# Patient Record
Sex: FEMALE | Race: WHITE
[De-identification: names, ages, dates, MRNs, and addresses within clinical notes are randomized per-mention and may not be internally consistent; named-entity substitution may affect disease eponyms.]

---
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
## 2018-01-01 ENCOUNTER — HOSPITAL ENCOUNTER (EMERGENCY)
Dept: HOSPITAL 46 - ED | Age: 18
LOS: 1 days | Discharge: HOME | End: 2018-01-02
Payer: COMMERCIAL

## 2018-01-01 VITALS — WEIGHT: 275 LBS | BODY MASS INDEX: 41.68 KG/M2 | HEIGHT: 68 IN

## 2018-01-01 DIAGNOSIS — E66.9: ICD-10-CM

## 2018-01-01 DIAGNOSIS — Z79.899: ICD-10-CM

## 2018-01-01 DIAGNOSIS — R10.10: Primary | ICD-10-CM

## 2018-01-01 DIAGNOSIS — J45.909: ICD-10-CM

## 2018-05-03 ENCOUNTER — HOSPITAL ENCOUNTER (EMERGENCY)
Dept: HOSPITAL 46 - ED | Age: 18
LOS: 1 days | Discharge: HOME | End: 2018-05-04
Payer: COMMERCIAL

## 2018-05-03 VITALS — BODY MASS INDEX: 41.62 KG/M2 | WEIGHT: 259 LBS | HEIGHT: 66 IN

## 2018-05-03 DIAGNOSIS — K59.00: Primary | ICD-10-CM

## 2018-07-25 ENCOUNTER — HOSPITAL ENCOUNTER (EMERGENCY)
Dept: HOSPITAL 46 - ED | Age: 18
Discharge: HOME | End: 2018-07-25
Payer: COMMERCIAL

## 2018-07-25 VITALS — BODY MASS INDEX: 43.07 KG/M2 | HEIGHT: 66 IN | WEIGHT: 267.99 LBS

## 2018-07-25 DIAGNOSIS — H57.8: Primary | ICD-10-CM

## 2019-01-01 ENCOUNTER — HOSPITAL ENCOUNTER (EMERGENCY)
Dept: HOSPITAL 46 - ED | Age: 19
LOS: 1 days | Discharge: HOME | End: 2019-01-02
Payer: COMMERCIAL

## 2019-01-01 VITALS — HEIGHT: 66 IN | BODY MASS INDEX: 43.07 KG/M2 | WEIGHT: 267.99 LBS

## 2019-01-01 DIAGNOSIS — K52.9: Primary | ICD-10-CM

## 2019-01-01 DIAGNOSIS — J45.909: ICD-10-CM

## 2019-03-03 ENCOUNTER — HOSPITAL ENCOUNTER (EMERGENCY)
Dept: HOSPITAL 46 - ED | Age: 19
Discharge: HOME | End: 2019-03-03
Payer: COMMERCIAL

## 2019-03-03 VITALS — HEIGHT: 66 IN | BODY MASS INDEX: 38.57 KG/M2 | WEIGHT: 240 LBS

## 2019-03-03 DIAGNOSIS — Z23: ICD-10-CM
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
## 2019-03-03 DIAGNOSIS — S91.312A: Primary | ICD-10-CM

## 2019-03-03 DIAGNOSIS — J45.909: ICD-10-CM

## 2019-03-03 DIAGNOSIS — Z79.899: ICD-10-CM

## 2019-09-25 ENCOUNTER — HOSPITAL ENCOUNTER (OUTPATIENT)
Dept: HOSPITAL 46 - DS | Age: 19
Discharge: HOME | End: 2019-09-25
Attending: OBSTETRICS & GYNECOLOGY
Payer: COMMERCIAL

## 2019-09-25 VITALS — HEIGHT: 66 IN | BODY MASS INDEX: 33.11 KG/M2 | WEIGHT: 206 LBS

## 2019-09-25 DIAGNOSIS — N92.1: ICD-10-CM

## 2019-09-25 DIAGNOSIS — Z62.810: ICD-10-CM

## 2019-09-25 DIAGNOSIS — F43.12: ICD-10-CM

## 2019-09-25 DIAGNOSIS — R10.2: ICD-10-CM

## 2019-09-25 DIAGNOSIS — K21.9: ICD-10-CM

## 2019-09-25 DIAGNOSIS — N94.4: Primary | ICD-10-CM

## 2019-09-25 DIAGNOSIS — Z79.899: ICD-10-CM

## 2019-09-25 PROCEDURE — 0WJJ4ZZ INSPECTION OF PELVIC CAVITY, PERCUTANEOUS ENDOSCOPIC APPROACH: ICD-10-PCS | Performed by: OBSTETRICS & GYNECOLOGY

## 2019-09-25 NOTE — NUR
PATIENT STANDS AND AMBULATES TO THE BATHROOM. PATIENT DENIES DIZZINESS.
PATIENT VOIDS 650 ML PINK URINE AND AMBULATES BACK TO HER ROOM. DISCHARGE
INSTRUCTIONS ARE GIVEN IN PRESENCE OF FRIEND AND MOTHER AND ALL VERBALIZES
UNDERSTANDING. PATIENT IS GETTING DRESSED IN PRESENCE OF FRIEND AND MOTHER.
PATIENT REPORTS A DECREASE IN PAIN AFTER VOIDING AND RATES HER PAIN 5/10 AT
THIS TIME.

## 2019-09-25 NOTE — OR
[facility]
                                    [address], [place]  [address]
_________________________________________________________________________________________
                                                                 Draft    
 
 
DATE OF OPERATION:
2019
 
SURGEON:
Madison Soto MD
 
PREOPERATIVE DIAGNOSES:
1. Pelvic pain.
2. Dysmenorrhea.
 
POSTOPERATIVE DIAGNOSES:
1. Pelvic pain.
2. Dysmenorrhea.
3. Normal pelvis.
 
PROCEDURE PERFORMED:
Diagnostic laparoscopy.
 
ANESTHESIA:
General ET.
 
ESTIMATED BLOOD LOSS:
Minimal.
 
DRAINS:
None.
 
INDICATIONS AND FINDINGS:
The patient is a 18-year-old female,  0, who has a long history of dysmenorrhea
and pelvic pain.  She did try birth control pills, but did not tolerate these because of
migraine with aura.  She was then placed on depo.  She has continued to have pelvic pain
and dysmenorrhea.  She is having abnormal bleeding, which in and of itself is not too
abnormal with that aura.  At the time of surgery, exam under anesthesia was normal.  At
the time of laparoscopy, the pelvis was completely normal. 
 
DESCRIPTION OF PROCEDURE:
The patient was prepped and draped in the dorsal lithotomy position.  A weighted
speculum was placed.  The anterior lip of the cervix was visualized and a single-tooth
tenaculum was placed on the anterior lip and this was followed by a Hulka clamp with
removal of the tenaculum and weighted speculum.  Attention was directed above.  The
infraumbilical area was injected with Marcaine, incised with a knife, and each layer was
then serially elevated and incised until the fascia was opened and identified, and stay
 
                                                                                    
_________________________________________________________________________________________
PATIENT NAME:     NAZ PÉREZ                        
MEDICAL RECORD #: [id number]            OPERATIVE REPORT              
          ACCT #: [id number]  
DATE OF BIRTH:   [birth year]            REPORT #: [id number]      
PHYSICIAN:        MADISON SOTO MD            
PCP:              MADISON SOTO MD            
REPORT IS CONFIDENTIAL AND NOT TO BE RELEASED WITHOUT AUTHORIZATION
 
 
                                  [facility]
                                    [address] [place] [address]  [address]
_________________________________________________________________________________________
                                                                 Draft    
 
 
sutures of 0 Vicryl were placed.  The peritoneum was opened bluntly.  The Saúl cannula
was then placed and balloon inflated.  CO2 was then introduced into the abdomen.
Placing the scope confirmed proper positioning.  A secondary port was placed on the left
side slightly below the umbilicus and lateral.  This area was transilluminated, injected
with the Marcaine, incised with a knife, and a trocar placed under direct vision.
Following this, the pelvis was completely visualized and no abnormalities were found.
There was no evidence of endometriosis or scar tissue.  The appendix was also visualized
and seen to be normal as well.  The procedure was then terminated with removal of the
instruments after allowing as much CO2 as possible to escape.  Following this, the
fascial incision was reidentified and the fascia was closed with running suture of 0
Vicryl.  The stay sutures were tied across as well.  The subcu and the deep space were
closed with a figure-of-eight suture of 0 Vicryl as well.  The skin incisions were
closed with subcuticular sutures of 3-0 Vicryl Rapide.  Attention was directed down
below and the instrument removed from the uterus.  There was no evidence of ongoing
bleeding from the cervix.  The procedure was then terminated.  The patient was taken to
the recovery room in good condition. 
 
 
 
            ________________________________________
            MD JERI Ivey/MODL
Job #:  [id number]
DD:  2019 13:40:39
DT:  2019 17:39:38
 
 
Copies:                                
~
 
 
 
 
 
 
 
 
 
 
 
 
                                                                                    
_________________________________________________________________________________________
PATIENT NAME:     NAZ PÉREZ                        
MEDICAL RECORD #: [id number]            OPERATIVE REPORT              
          ACCT #: [id number]  
DATE OF BIRTH:   [birth year]            REPORT #: [id number]      
PHYSICIAN:        MADISON SOTO MD            
PCP:              MADISON SOTO MD            
REPORT IS CONFIDENTIAL AND NOT TO BE RELEASED WITHOUT AUTHORIZATION

## 2019-12-10 ENCOUNTER — HOSPITAL ENCOUNTER (EMERGENCY)
Dept: HOSPITAL 46 - ED | Age: 19
Discharge: HOME | End: 2019-12-10
Payer: COMMERCIAL

## 2019-12-10 VITALS — HEIGHT: 66 IN | WEIGHT: 206 LBS | BODY MASS INDEX: 33.11 KG/M2

## 2019-12-10 DIAGNOSIS — J11.1: Primary | ICD-10-CM

## 2019-12-10 DIAGNOSIS — Z79.899: ICD-10-CM

## 2019-12-10 DIAGNOSIS — J45.909: ICD-10-CM

## 2020-11-22 ENCOUNTER — HOSPITAL ENCOUNTER (EMERGENCY)
Dept: HOSPITAL 46 - ED | Age: 20
LOS: 1 days | Discharge: HOME | End: 2020-11-23
Payer: COMMERCIAL

## 2020-11-22 VITALS — BODY MASS INDEX: 35.68 KG/M2 | WEIGHT: 222 LBS | HEIGHT: 66 IN

## 2020-11-22 DIAGNOSIS — Z79.899: ICD-10-CM

## 2020-11-22 DIAGNOSIS — K29.20: Primary | ICD-10-CM

## 2020-11-22 DIAGNOSIS — J45.909: ICD-10-CM

## 2020-11-22 NOTE — XMS
PreManage Notification: NAZ PÉREZ MRN:[id number]
 
Security Information
 
Security Events
No recent Security Events currently on file
 
 
 
CRITERIA MET
------------
- [facility] Has Care Guidelines
 
 
CARE PROVIDERS
There are no care providers on record at this time.
 
Guidelines Source: [employer]  [place]
Guidelines Date: 12/20/2019
 
Care Coordination:
    Has\T\nbsp;received\T\nbsp;mental health services with [employer].\T\nbsp; Please 
    contact [employer] with mental health\T\nbsp;concerns.\T\nbsp; [place]
    [facility]: [phone]\T\nbsp; [place]: [phone].
 
 
E.D. VISIT COUNT (12 MO.)
-------------------------------------------------------------------------------------
1 [address]
-------------------------------------------------------------------------------------
TOTAL 3
-------------------------------------------------------------------------------------
NOTE: Visits indicate total known visits.
 
ED/UCC VISIT TRACKING (12 MO.)
-------------------------------------------------------------------------------------
11/22/2020 20:28
[facility] [place]
 
TYPE: Emergency
 
COMPLAINT:
- VOMITING
-------------------------------------------------------------------------------------
08/30/2020 00:47
[facility] [place]
.
 
TYPE: Emergency
 
COMPLAINT:
- Left thumb lac
 
DIAGNOSES:
- Left thumb lac
- Laceration without foreign body of left thumb without damage to nail, initial
encounter
 
- Laceration
-------------------------------------------------------------------------------------
12/10/2019 01:32
[facility] [place]
 
TYPE: Emergency
 
COMPLAINT:
- HIGH FEVER
 
DIAGNOSES:
- Other long term (current) drug therapy
- Cough
- Influenza due to unidentified influenza virus with other respiratory manifestations
- Unspecified asthma, uncomplicated
-------------------------------------------------------------------------------------
 
 
INPATIENT VISIT TRACKING (12 MO.)
No inpatient visits to display in this time frame
 
[email or web address]

## 2021-05-22 ENCOUNTER — HOSPITAL ENCOUNTER (EMERGENCY)
Dept: HOSPITAL 46 - ED | Age: 21
Discharge: HOME | End: 2021-05-22
Payer: COMMERCIAL

## 2021-05-22 VITALS — BODY MASS INDEX: 35.03 KG/M2 | WEIGHT: 217.99 LBS | HEIGHT: 66 IN

## 2021-05-22 DIAGNOSIS — Z79.899: ICD-10-CM

## 2021-05-22 DIAGNOSIS — Z88.8: ICD-10-CM

## 2021-05-22 DIAGNOSIS — J45.909: ICD-10-CM

## 2021-05-22 DIAGNOSIS — S43.402A: Primary | ICD-10-CM
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
## 2021-05-22 NOTE — XMS
PreManage Notification: NAZ PÉREZ MRN:[id number]
 
Security Information
 
Security Events
No recent Security Events currently on file
 
 
 
CRITERIA MET
------------
- [facility] - Has Care Guidelines
 
 
CARE PROVIDERS
-------------------------------------------------------------------------------------
DNAIEL HINTON     Internal Medicine     11/23/2020-Current
 
PHONE: [phone]
-------------------------------------------------------------------------------------
PATRICIA HAMLIN      Obstetrics \T\ Gynecology     11/23/2020-Current
 
PHONE: [phone]
-------------------------------------------------------------------------------------
 
Guidelines Source: "[employer]
Guidelines Date: 12/20/2019
 
Care Coordination:
    Has\T\nbsp;received\T\nbsp;mental health services with [employer].\T\nbsp; Please 
    contact [employer] with mental health\T\nbsp;concerns.\T\nbsp; [place]: [phone]\T\nbsp; [place]: [phone].
 
Care History
Medical/Surgical
11/16/2020    [facility]
 
      - Patient is currently established with [facility]. If patient is seen 
      in the ED during business hours. Please contact [facility] at [facility].
      Care Recommendation: If this patient has had 5 or more Emergency Department
      visits in the last 12 months.\T\nbsp; Patient will require education on the
      scope and purpose of the ED as an acute care provider not a Primary Care
      Provider and should not be utilized for chronic conditions.\T\nbsp; These are
      guidelines and the provider should exercise clinical judgment when providing
      care.
[facility].D. V[facility] (12 MO.)
[facility]
TOTAL 3
-------------------------------------------------------------------------------------
NOTE: Visits indicate total known visits.
 
ED/UCC VISIT TRACKING (12 MO.)
-------------------------------------------------------------------------------------
05/22/2021 14:25
[facility] [place]
 
 
TYPE: Emergency
 
COMPLAINT:
- LT SHOULDER OTJ INJURY
-------------------------------------------------------------------------------------
11/22/2020 20:28
[facility] [place]
 
TYPE: Emergency
 
COMPLAINT:
- VOMITING
 
DIAGNOSES:
- Unspecified asthma, uncomplicated
- Nausea with vomiting, unspecified
- Alcoholic gastritis without bleeding
- Other long term (current) drug therapy
-------------------------------------------------------------------------------------
08/30/2020 00:47
[facility] [place]

 
TYPE: Emergency
 
COMPLAINT:
- Left thumb lac
 
DIAGNOSES:
- Left thumb lac
- Laceration without foreign body of left thumb without damage to nail, initial
encounter
- Laceration
-------------------------------------------------------------------------------------
 
 
INPATIENT VISIT TRACKING (12 MO.)
No inpatient visits to display in this time frame
 
[email or web address]

## 2022-06-28 ENCOUNTER — HOSPITAL ENCOUNTER (EMERGENCY)
Dept: HOSPITAL 46 - ED | Age: 22
Discharge: HOME | End: 2022-06-28
Payer: COMMERCIAL

## 2022-06-28 VITALS — HEIGHT: 66 IN | WEIGHT: 189.99 LBS | BODY MASS INDEX: 30.53 KG/M2

## 2022-06-28 DIAGNOSIS — Z20.822: ICD-10-CM

## 2022-06-28 DIAGNOSIS — K52.9: Primary | ICD-10-CM

## 2022-06-28 DIAGNOSIS — J45.909: ICD-10-CM

## 2022-06-28 DIAGNOSIS — Z88.8: ICD-10-CM

## 2022-06-28 PROCEDURE — U0003 INFECTIOUS AGENT DETECTION BY NUCLEIC ACID (DNA OR RNA); SEVERE ACUTE RESPIRATORY SYNDROME CORONAVIRUS 2 (SARS-COV-2) (CORONAVIRUS DISEASE [COVID-19]), AMPLIFIED PROBE TECHNIQUE, MAKING USE OF HIGH THROUGHPUT TECHNOLOGIES AS DESCRIBED BY CMS-2020-01-R: HCPCS

## 2022-06-28 NOTE — EKG
[facility]
                                    [address]
                                  [place], [place]  [address]
_________________________________________________________________________________________
                                                                 Signed   
 
 
Marked sinus bradycardia with premature atrial complexes
Nonspecific T wave abnormality
Abnormal ECG
No previous ECGs available
Confirmed by RICKY ARGUELLES MD ([id number]) on 6/28/2022 8:26:06 PM
 
 
 
 
 
 
 
 
 
 
 
 
 
 
 
 
 
 
 
 
 
 
 
 
 
 
 
 
 
 
 
 
 
 
 
 
 
 
 
 
    Electronically Signed By: RICKY ARGUELLES MD  06/28/22 2026
_________________________________________________________________________________________
PATIENT NAME:     NAZ PÉREZ                        
MEDICAL RECORD #: [id number]                     Electrocardiogram             
          ACCT #: [id number]  
DATE OF BIRTH:   [birth year]                                       
PHYSICIAN:   RICKY ARGUELLES MD                   REPORT #: [id number]
REPORT IS CONFIDENTIAL AND NOT TO BE RELEASED WITHOUT AUTHORIZATION

## 2023-05-21 ENCOUNTER — HOSPITAL ENCOUNTER (EMERGENCY)
Dept: HOSPITAL 46 - ED | Age: 23
LOS: 1 days | Discharge: HOME | End: 2023-05-22
Payer: COMMERCIAL

## 2023-05-21 VITALS — HEIGHT: 66 IN | BODY MASS INDEX: 33.3 KG/M2 | WEIGHT: 207.23 LBS

## 2023-05-21 DIAGNOSIS — J45.909: ICD-10-CM

## 2023-05-21 DIAGNOSIS — A08.4: Primary | ICD-10-CM

## 2023-05-21 DIAGNOSIS — Z79.899: ICD-10-CM

## 2023-05-21 DIAGNOSIS — Z88.8: ICD-10-CM

## 2023-05-21 PROCEDURE — A9270 NON-COVERED ITEM OR SERVICE: HCPCS

## 2023-05-22 VITALS — DIASTOLIC BLOOD PRESSURE: 87 MMHG | SYSTOLIC BLOOD PRESSURE: 123 MMHG

## 2025-04-26 ENCOUNTER — HOSPITAL ENCOUNTER (EMERGENCY)
Dept: HOSPITAL 46 - ED | Age: 25
Discharge: HOME | End: 2025-04-26
Payer: MEDICAID

## 2025-04-26 VITALS — SYSTOLIC BLOOD PRESSURE: 156 MMHG | DIASTOLIC BLOOD PRESSURE: 81 MMHG

## 2025-04-26 VITALS — HEIGHT: 66 IN | BODY MASS INDEX: 37.84 KG/M2 | WEIGHT: 235.46 LBS
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
## 2025-04-26 DIAGNOSIS — S90.852A: Primary | ICD-10-CM

## 2025-04-26 DIAGNOSIS — Z88.8: ICD-10-CM
